# Patient Record
Sex: FEMALE | Race: WHITE
[De-identification: names, ages, dates, MRNs, and addresses within clinical notes are randomized per-mention and may not be internally consistent; named-entity substitution may affect disease eponyms.]

---

## 2018-01-08 ENCOUNTER — HOSPITAL ENCOUNTER (EMERGENCY)
Dept: HOSPITAL 74 - JERFT | Age: 68
Discharge: HOME | End: 2018-01-08
Payer: COMMERCIAL

## 2018-01-08 VITALS — TEMPERATURE: 97.8 F | SYSTOLIC BLOOD PRESSURE: 140 MMHG | HEART RATE: 67 BPM | DIASTOLIC BLOOD PRESSURE: 88 MMHG

## 2018-01-08 VITALS — BODY MASS INDEX: 31.9 KG/M2
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
## 2018-01-08 DIAGNOSIS — I10: ICD-10-CM

## 2018-01-08 DIAGNOSIS — S83.8X2A: Primary | ICD-10-CM

## 2018-01-08 DIAGNOSIS — Z96.642: ICD-10-CM

## 2018-01-08 NOTE — PDOC
History of Present Illness





- General


Chief Complaint: Injury


Stated Complaint: FALL


Time Seen by Provider: 01/08/18 10:08


History Source: Patient


Exam Limitations: No Limitations





- History of Present Illness


Initial Comments: 





01/08/18 10:15


Slipped and fell causing a lateral twisting movement of her left leg and knee. 

Patient has been nonweightbearing since. Had a hip replacement 2 years ago on 

same affected side. Denies numbness or tingling to foot but has exquisite pain 

and swelling with deformity of left knee


Occurred: reports: just prior to arrival, this morning


Severity: reports: moderate


Pain Location: reports: lower extremity (left knee)


Modifying Factors: improves with: cold therapy


Associated Symptoms (Fall): denies symptoms





Past History





- Travel


Traveled outside of the country in the last 30 days: No


Close contact w/someone who was outside of country & ill: No





- Past Medical History


Allergies/Adverse Reactions: 


 Allergies











Allergy/AdvReac Type Severity Reaction Status Date / Time


 


No Known Allergies Allergy   Verified 01/08/18 09:02











Home Medications: 


Ambulatory Orders





Olmesartan Medoxomil [Benicar (Nf)] 40 mg PO DAILY 01/08/18 








COPD: No


HTN: Yes





- Suicide/Smoking/Psychosocial Hx


Smoking History: Former smoker


Have you smoked in the past 12 months: No


Information on smoking cessation initiated: No


Hx Alcohol Use: Yes


Drug/Substance Use Hx: No


Substance Use Type: None





**Review of Systems





- Review of Systems


Able to Perform ROS?: Yes


Is the patient limited English proficient: Yes


Constitutional: Yes: Symptoms Reported, See HPI.  No: Malaise


HEENTM: No: Symptoms Reported


Musculoskeletal: Yes: Symptoms Reported, See HPI, Joint Pain, Joint Swelling


Integumentary: Yes: Symptoms Reported, See HPI, Bruising


Neurological: Yes: See HPI.  No: Symptoms reported


All Other Systems: Reviewed and Negative





*Physical Exam





- Vital Signs


 Last Vital Signs











Temp Pulse Resp BP Pulse Ox


 


 97.8 F   67   20   140/88   97 


 


 01/08/18 09:03  01/08/18 09:03  01/08/18 09:03  01/08/18 09:03  01/08/18 09:03














- Physical Exam


General Appearance: Yes: Nourished, Appropriately Dressed, Apparent Distress


HEENT: positive: SAM, Normal ENT Inspection, TMs Normal, Pharynx Normal


Neck: positive: Supple.  negative: Tender


Respiratory/Chest: positive: Lungs Clear.  negative: Normal Breath Sounds


Cardiovascular: negative: Regular Rhythm


Gastrointestinal/Abdominal: positive: Soft


Musculoskeletal: negative: Normal Inspection


Extremity: positive: Normal Capillary Refill, Swelling, Other (vascular intact 

to foot, but significant tenderness reproduced along both medial and lateral 

aspects of knee and patella is not mobile secondary to swelling and tenderness.)

.  negative: Normal Inspection, Normal Range of Motion


Integumentary: positive: Normal Color


Neurologic: positive: CNs II-XII NML intact, Fully Oriented, Alert, Normal Mood/

Affect, Normal Response, Motor Strength 5/5





ED Treatment Course





- RADIOLOGY


Radiology Studies Ordered: 














 Category Date Time Status


 


 KNEE 3 POS-LEFT [RAD] Stat Radiology  01/08/18 10:13 Ordered














Progress Note





- Progress Note


Progress Note: 





Left knee sprain, knee immobilizer placed, reviewed x-rays with radiologist and 

no obvious bony deformity is identified. May have some chronic changes from 

ligamentous injuries. Patient has orthopedist who she will follow up with this 

week. Refuses Percocet for pain relief





*DC/Admit/Observation/Transfer


Diagnosis at time of Disposition: 


Left knee sprain


Qualifiers:


 Encounter type: initial encounter Involved ligament of knee: unspecified 

ligament Qualified Code(s): S83.92XA - Sprain of unspecified site of left knee, 

initial encounter








- Discharge Dispostion


Disposition: HOME


Condition at time of disposition: Stable


Admit: No





- Referrals


Referrals: 


Finn Ruiz [Primary Care Provider] - 





- Patient Instructions


Printed Discharge Instructions:  DI for Knee Sprain


Additional Instructions: 


Rest, ice to area on and off for 15 minutes 4-6 times a day


Avoid heavy lifting or exercise until pain and swelling is resolved or until 

further directed


Keep area highly elevated to reduce swelling


Use splints/Ace wrap as directed


Followup with orthopedist in one to 2 days if not improving, 


    if significantly improved may wait one week for followup with orthopedist





May use ibuprofen 2-200 mg tablets every 6 hours as needed for pain





- Post Discharge Activity


Forms/Work/School Notes:  Back to Work